# Patient Record
Sex: FEMALE | URBAN - METROPOLITAN AREA
[De-identification: names, ages, dates, MRNs, and addresses within clinical notes are randomized per-mention and may not be internally consistent; named-entity substitution may affect disease eponyms.]

---

## 2019-09-16 ENCOUNTER — OFFICE VISIT (OUTPATIENT)
Dept: FAMILY MEDICINE | Facility: CLINIC | Age: 64
End: 2019-09-16

## 2019-09-16 VITALS
HEIGHT: 59 IN | HEART RATE: 90 BPM | WEIGHT: 171.5 LBS | BODY MASS INDEX: 34.57 KG/M2 | DIASTOLIC BLOOD PRESSURE: 90 MMHG | OXYGEN SATURATION: 97 % | TEMPERATURE: 98.1 F | SYSTOLIC BLOOD PRESSURE: 156 MMHG | RESPIRATION RATE: 16 BRPM

## 2019-09-16 DIAGNOSIS — T14.8XXA WOUND OF SKIN: ICD-10-CM

## 2019-09-16 DIAGNOSIS — F32.A DEPRESSION, UNSPECIFIED DEPRESSION TYPE: ICD-10-CM

## 2019-09-16 DIAGNOSIS — K59.00 CONSTIPATION, UNSPECIFIED CONSTIPATION TYPE: ICD-10-CM

## 2019-09-16 DIAGNOSIS — Z79.4 TYPE 2 DIABETES MELLITUS WITH OTHER SKIN COMPLICATION, WITH LONG-TERM CURRENT USE OF INSULIN (H): ICD-10-CM

## 2019-09-16 DIAGNOSIS — E11.628 TYPE 2 DIABETES MELLITUS WITH OTHER SKIN COMPLICATION, WITH LONG-TERM CURRENT USE OF INSULIN (H): ICD-10-CM

## 2019-09-16 DIAGNOSIS — I10 BENIGN ESSENTIAL HYPERTENSION: Primary | ICD-10-CM

## 2019-09-16 RX ORDER — IBUPROFEN 200 MG
CAPSULE ORAL 2 TIMES DAILY
Qty: 28.4 G | Refills: 0 | Status: SHIPPED | OUTPATIENT
Start: 2019-09-16

## 2019-09-16 RX ORDER — HYDROCHLOROTHIAZIDE 12.5 MG/1
25 CAPSULE ORAL DAILY
Qty: 60 CAPSULE | Refills: 0 | Status: SHIPPED | OUTPATIENT
Start: 2019-09-16

## 2019-09-16 RX ORDER — AMOXICILLIN 250 MG
1 CAPSULE ORAL DAILY
Qty: 30 TABLET | Refills: 0 | Status: SHIPPED | OUTPATIENT
Start: 2019-09-16

## 2019-09-16 RX ORDER — SERTRALINE HYDROCHLORIDE 25 MG/1
25 TABLET, FILM COATED ORAL DAILY
Qty: 30 TABLET | Refills: 0 | Status: SHIPPED | OUTPATIENT
Start: 2019-09-16

## 2019-09-16 SDOH — HEALTH STABILITY: MENTAL HEALTH: HOW OFTEN DO YOU HAVE A DRINK CONTAINING ALCOHOL?: NEVER

## 2019-09-16 ASSESSMENT — MIFFLIN-ST. JEOR: SCORE: 1229.42

## 2019-09-16 NOTE — Clinical Note
I have completed my note, please review, add tie in statement and close encounter. Thanks! [email or web address]

## 2019-09-17 NOTE — PROGRESS NOTES
MEDICINE NOTE    SUBJECTIVE:  Kenia is a pleasant, 64 year old woman with a history of T2D and HTN, who comes to clinic [facility] to get medication refills. She lives in [facility], but has been in the [place] visiting family for 3 weeks. She plans to return to [facility] in 6 days. She has run out of her furosemide, hydrochlorothiazide, and insulin. She ran out of the insulin this morning, but the other medications in the past few days. She takes 40 units of NPH insulin every morning and 30 units at night, but she only checks her blood sugar about once every 3 months. 2-3 times per week she will notice changes in her vision and believe her blood sugar is high. She notices a sensation in her feet like they have fallen asleep. She explains a bruise on her left arm and scrape on her right knee, saying she fell outside several days ago. She also reports constipation, with her most recent bowel movement being three days ago. This has caused some occasional abdominal discomfort. In addition, she has experienced a loss of appetite, fatigue, and has been feeling down recently due to her medical conditions, and would be interested in medication for these symptoms.     Per : She saw ophthalmology upstairs but was unable to receive an eye exam due to bilateral cataracts. Advised to pursue treatment in [facility] or at [facility] upon return to the  in one year.    REVIEW OF SYSTEMS:  Gen: no fevers  Eyes: no vision change or diplopia  Cardiac: no chest pain  Lungs: no cough or shortness of breath  GI: as documented   Musculoskeletal: no joint or muscle pain or swelling  Skin: as documented   Allergy: no environmental or drug allergies  Psych: low mood, feeling sad and hopeless    No past medical history on file.    No past surgical history on file.    No family history on file.    Social History     Socioeconomic History     Marital status: Single     Spouse name: Not on file     Number of children: Not on file      "Years of education: Not on file     Highest education level: Not on file   Occupational History     Not on file   Social Needs     Financial resource strain: Not on file     Food insecurity:     Worry: Not on file     Inability: Not on file     Transportation needs:     Medical: Not on file     Non-medical: Not on file   Tobacco Use     Smoking status: Never Smoker   Substance and Sexual Activity     Alcohol use: Never     Frequency: Never     Drug use: Not on file     Sexual activity: Not on file   Lifestyle     Physical activity:     Days per week: Not on file     Minutes per session: Not on file     Stress: Not on file   Relationships     Social connections:     Talks on phone: Not on file     Gets together: Not on file     Attends Yazdanism service: Not on file     Active member of club or organization: Not on file     Attends meetings of clubs or organizations: Not on file     Relationship status: Not on file     Intimate partner violence:     Fear of current or ex partner: Not on file     Emotionally abused: Not on file     Physically abused: Not on file     Forced sexual activity: Not on file   Other Topics Concern     Not on file   Social History Narrative     Not on file       OBJECTIVE:  Physical Exam:  BP (!) 156/90 (BP Location: Left arm, Patient Position: Sitting, Cuff Size: Adult Regular)   Pulse 90   Temp 98.1  F (36.7  C) (Oral)   Resp 16   Ht 1.5 m (4' 11.06\")   Wt 77.8 kg (171 lb 8 oz)   SpO2 97%   BMI 34.57 kg/m    Constitutional: no distress, comfortable, pleasant   Eyes: anicteric, normal extra-ocular movements   Ears, Nose and Throat: nose clear and free of lesions,  Cardiovascular: regular rate and rhythm, normal S1 and S2, no murmurs, rubs or gallops  Respiratory: clear to auscultation, no wheezes or crackles, normal breath sounds   Gastrointestinal: positive bowel sounds, nontender, no masses   Musculoskeletal: full range of motion, 1+ pedal edema   Skin: several small wounds (<1cm) " with sanguinous crusts along forearms, large healing contusion on upper left arm  Neurological: cranial nerves intact. Strength, sensation, and reflexes symmetric and normal throughout  Psychological: flat affect, (Social Work PHQ9: 11)      ASSESSMENT/PLAN:  Kenia was seen today for refill request.    Diagnoses and all orders for this visit:    Benign essential hypertension  -     hydrochlorothiazide (MICROZIDE) 12.5 MG capsule; Take 2 capsules (25 mg) by mouth daily    Depression, unspecified depression type  -     sertraline (ZOLOFT) 25 MG tablet; Take 1 tablet (25 mg) by mouth daily    Wound of skin  -     Neomycin-Bacitracin-Polymyxin (TRIPLE ANTIBIOTIC) 3.5-400-5000 OINT ointment; Externally apply topically 2 times daily    Constipation, unspecified constipation type  -     senna-docusate (SENOKOT-S/PERICOLACE) 8.6-50 MG tablet; Take 1 tablet by mouth daily    Type 2 diabetes mellitus with other skin complication, with long-term current use of insulin (H)  -     insulin NPH (HUMULIN N VIAL) 100 UNIT/ML vial; 40 units in the morning and 30 units in the evening    1. We refilled the hydrochlorothiazide but not furosemide, since we were unable to obtain lab values tonight and want to avoid hypokalemia. Kenia acknowledged having hypertension, but was unaware of any heart failure diagnosis.    2. Sertraline at a minimal dose, with direction to follow up in one month with her primary care provider in [facility].     3. Triple antibiotic cream for Kenia's skin lesions, rather than an oral antibiotic, since they are minor and healing.    4. Senna plus to address her recent constipation.    5. We were able to provide a prescription for the insulin Kenia has used, so that she may purchase it elsewhere, since we do not have insulin in our pharmacy.        Med Clinician: Sarah Estevez, MS2  Preceptor: Pearl Horvath MD    In supervising the medical student, I repeated the exam documented above.  I have reviewed and  verified the student s documentation.  Supervising Provider: Pearl Horvath MD  Additional Comments: Agree with excellent student note above. Patient will only be in the [place] for an additional week so a month refill of medications provided to bridge until she returns to [facility] to follow up with her PCP there. As patient will be only here for a week, labwork was not obtained as she would not be able to follow up. Insulin rx was given and advised to monitor blood sugar and symptoms as able. Contracts for safety and interested in medication so low dose serotonin specific reuptake inhibitor initiated and will follow up with PCP in [facility]. Minor wounds with no signs of cellulitis so will use topical antibiotic. For her HTN, her BP is slightly elevated but neurologically intact with no symptoms so will refill hydrochlorothiazide but will hold on lasix. She has minor pedal edema but no known heart failure or signs of fluid overload at this time so will hold off on refilling lasix to avoid hypokalemia until she can return to see her PCP for labwork. Medication side effects, risks, and benefits reviewed.

## 2019-09-17 NOTE — PROGRESS NOTES
"[facility] Progress Note    Chief complaint: General Check-up/Medication refill    Subjective: ( No subjective was gained due to time conflict. We did not have the first meeting, but only second meeting with a med preceptor)  Condition 1: Hypertension   - She told nurse that she has frequent blurry vision when she has high blood pressure. She just ran out of her furosemide, hydrochlorothiazide, and insulin so that she needs some medications until she goes back to [facility] in 6 days.    Condition 2: Depression She told nurse that she has a sleeping issue and loss of appetite due to depression.  Condition 3: Bruise- she told nurse that she fell down and got bruise on her left upper arm and her right knee.  Condition 4: Constipation- She told nurse that she has constipation for 3 months.    Current Outpatient Medications   Medication Sig Dispense Refill     hydrochlorothiazide (MICROZIDE) 12.5 MG capsule Take 2 capsules (25 mg) by mouth daily 60 capsule 0     insulin NPH (HUMULIN N VIAL) 100 UNIT/ML vial 40 units in the morning and 30 units in the evening 21 mL 0     Neomycin-Bacitracin-Polymyxin (TRIPLE ANTIBIOTIC) 3.5-400-5000 OINT ointment Externally apply topically 2 times daily 28.4 g 0     senna-docusate (SENOKOT-S/PERICOLACE) 8.6-50 MG tablet Take 1 tablet by mouth daily 30 tablet 0     sertraline (ZOLOFT) 25 MG tablet Take 1 tablet (25 mg) by mouth daily 30 tablet 0         Objective:   BP (!) 156/90 (BP Location: Left arm, Patient Position: Sitting, Cuff Size: Adult Regular)   Pulse 90   Temp 98.1  F (36.7  C) (Oral)   Resp 16   Ht 1.5 m (4' 11.06\")   Wt 77.8 kg (171 lb 8 oz)   SpO2 97%   BMI 34.57 kg/m      Assessment:     Condition 1- Hypertension  DTP: Needs Additional Therapy: Out-of medication   Rationale: The patient was encouraged to use 12.5 mg capsule of hydrochlorothiazide for her hypertension. Furosemide was taken out because having a loop diuretic medication and a thiazide medication are " uncommon. She was encouraged to go to [facility] and purchase insulin NPH (100 uni/ml vial) since it was not in our stock. The prescription for same type of NPH the patient was using in the past was given to the patient.       Condition 2- Depression  DTP:Needs Additional Therapy: Untreated medication  Rationale: The patient was encouraged take 1 tablet of 25 mg sertraline for her depression. Her PHQ9 was 11 which means it is mild to moderate depression. She has sleep issues and loss of appetite.       Condition 3- Bruise  DTP: Needs Additional Therapy: Untreated medication  Rationale: The patient was encouraged to apply neomycin-bacitracin-polymyxin topically on her bruise on the left arm and lower right leg twice daily. The scars were closed, but it was given to make sure she does not get further infection.               Condition 4- Constipation  DTP: Needs Additional Therapy: Untreated medication  Rationale: The encouraged to take senna plus once daily for her constipation.     Plan:  1. Teach her how to appropriately take and apply medication with right amount.   2. Provide the patient a prescription for the insulin.  3. Recommend her to go to [facility] to get the Insulin.  4. Recommended her to go see her private doctor when she goes back to [place] to be followed-up.    Pharmacy Follow-Up Plan (Method, Date, Parameters): No Follow-up/She will be gone to [facility] Clinician: Rachel Amador  Pharmacy Preceptor: Evin Salvador    _____________________________  Preceptor Use Only:  In supervising the student, I have reviewed and verified the student's documentation and found it to be correct and complete.   Preceptor Signature: Evin Salvador, Pharm.D.

## 2019-09-17 NOTE — PROGRESS NOTES
Pt came in for a refill of several medications- Furosemide, Hydorchlorothazide, and Insulin. She is visiting from [facility] and has been staying with her daughter in the the  for 3 weeks now. She is a diabetic and does not check her blood glucose daily. In [facility], she checks it every 3 months. She has some numbness in her feet describing it as her feet feeling like they are asleep. She notices some visiion changes when her BG is high. She also c/o constipation for 3 months. She took a medication her daughter was given before for constipation but that did not help the pt. She also had a fall 3 days ago and had a bruise on left arm and on her right knee. She stated she did not hit her head or have any other injuries. Does not use caffeine,tobacco, alcohol, or illicit drugs. PHQ-2 was 3. Social work was present and continued to do the PHQ9. See SW note.    Hiwot Lainez Student Nurse

## 2019-09-17 NOTE — PROGRESS NOTES
Patient displayed flattened affect, by speaking low and quiet, making little eye contact, and using few words. PHQ9 was 11, and patient indicated interest in speaking with doctors about possible medication to address fatigue, sleep issues, and possible mild depression.  Ofe Gil, student

## 2019-09-17 NOTE — PATIENT INSTRUCTIONS
"Resumen de la Visita    Nombre del Paciente: Kenia Treviño  MRN: [id number]    Fecha de la [place]: 9/16/19    Dianostico medico principal: Diabetes, la presion iliana, y depresion    Recomendaciones/Instrucciones del Medico:   Nessa hidrochlorathiazida (hydrochlorothiazide) diara para la presion iliana  Nessa senna \"plus\" brad vez diaria para el estrenimiento  Usa el antibiotico triple dos veces cada diara en las heridas pequenas en [place]  Nessa insulin NPH 40 por la manana y 30 por la tarde  Empieza Sertraline 25 mg diara para la depresion  Sigue con bryan doctor regular en [place] dentro de brad mes cuando regresa para discutir el exito y algunos efectos negativos de los medicamentos.       Medico: Tiffany Estevez (estudiante de medicina)  "